# Patient Record
Sex: MALE | Race: BLACK OR AFRICAN AMERICAN | Employment: FULL TIME | URBAN - METROPOLITAN AREA
[De-identification: names, ages, dates, MRNs, and addresses within clinical notes are randomized per-mention and may not be internally consistent; named-entity substitution may affect disease eponyms.]

---

## 2023-08-01 ENCOUNTER — OFFICE VISIT (OUTPATIENT)
Age: 33
End: 2023-08-01

## 2023-08-01 VITALS
SYSTOLIC BLOOD PRESSURE: 130 MMHG | WEIGHT: 205 LBS | HEART RATE: 73 BPM | TEMPERATURE: 97.4 F | OXYGEN SATURATION: 98 % | DIASTOLIC BLOOD PRESSURE: 88 MMHG

## 2023-08-01 DIAGNOSIS — Z20.2 POSSIBLE EXPOSURE TO STD: Primary | ICD-10-CM

## 2023-08-01 ASSESSMENT — ENCOUNTER SYMPTOMS
ALLERGIC/IMMUNOLOGIC NEGATIVE: 1
EYES NEGATIVE: 1
GASTROINTESTINAL NEGATIVE: 1
RESPIRATORY NEGATIVE: 1

## 2023-08-01 NOTE — PROGRESS NOTES
Pink Homans (:  1990) is a 33 y.o. male,New patient, here for evaluation of the following chief complaint(s):  Sexually Transmitted Diseases (Std test, exposure no symptoms. )      ASSESSMENT/PLAN:  Visit Diagnoses and Associated Orders       Possible exposure to STD    -  Primary    Trichomonas by EIA [07044 Custom]   - Future Order    C.trachomatis N.gonorrhoeae DNA, Urine [place] Only) [[id number] Custom]   - Future Order    C.trachomatis N.gonorrhoeae DNA, Urine ([place] Only) [[id number] Custom]      Trichomonas by EIA [35170 Custom]                    Patient presents requesting STD testing. Denies concerns or known exposure. Has previous unprotected sexual intercourse. Now has a new partner and wants to be checked prior to having intercourse with this individual.  States was seen at a health clinic a few weeks ago and had bloodwork for STD's and was told it is all negative. VSS. Exam as documented. Urine trich and GC collected. Results pending. Advised patient we will call over the next few days with results and can advise on treatment if needed. Patient requests work note as he missed work today to be checked. Worn note provided with RTW date of 23. Follow up as needed if symptoms or other concerns arise. Discussed reasons to return or go to the ED. Patient agreeable with plan. SUBJECTIVE/OBJECTIVE:    History provided by:  Patient   used: No       33 y.o. male presents requesting STD testing. Patient is concerned for possible exposure after recent unprotected intercourse. Is seeing a new person and wants to get checked before having intercourse. Denies symptoms at time of exam.  Denies fever, dysuria, abdominal pain, back or flank pain, penile discharge, rash, ulcers, lesions, or other concerns. Denies treatment PTA. Denies significant PMH. Reports history of chlamydia treated with PO antibiotics several years ago.          Vitals:    23

## 2023-08-01 NOTE — PATIENT INSTRUCTIONS
Do not have unprotected intercourse until all results are finalized. We will call you with results of testing over the next few days.

## 2023-08-02 LAB
SPECIMEN TYPE: NORMAL
TRICHOMONAS VAGINALIS SCREEN: NEGATIVE

## 2023-08-03 LAB
C TRACH DNA UR QL NAA+PROBE: NEGATIVE
N GONORRHOEA DNA UR QL NAA+PROBE: NEGATIVE

## 2023-08-04 ENCOUNTER — TELEPHONE (OUTPATIENT)
Dept: URGENT CARE | Age: 33
End: 2023-08-04

## 2023-08-04 NOTE — TELEPHONE ENCOUNTER
Attempted to call patient, number no longer in service. If patient calls office for results please let him know his testing for Gonorrhea and Chlamydia was negative.

## 2023-08-07 ENCOUNTER — NURSE ONLY (OUTPATIENT)
Age: 33
End: 2023-08-07

## 2023-08-07 DIAGNOSIS — Z71.2 ENCOUNTER TO DISCUSS TEST RESULTS: Primary | ICD-10-CM

## 2023-08-07 NOTE — PROGRESS NOTES
Patient returned to clinic for results. Discussed results with patient. All questions answered. He is to return to clinic as needed.     Results for orders placed or performed in visit on 08/01/23 (from the past 2160 hour(s))   C.trachomatis N.gonorrhoeae DNA, Urine ([place] Only)    Specimen: Urine   Result Value Ref Range    C. trachomatis DNA ,Urine Negative Negative    N. gonorrhoeae DNA, Urine Negative Negative   Trichomonas by EIA   Result Value Ref Range    TRICHOMONAS VAGINALIS SCREEN Negative Negative    Specimen Type Urine

## 2023-08-21 ENCOUNTER — HOSPITAL ENCOUNTER (EMERGENCY)
Age: 33
Discharge: HOME | End: 2023-08-21
Attending: EMERGENCY MEDICINE
Payer: COMMERCIAL

## 2023-08-21 VITALS
OXYGEN SATURATION: 97 % | WEIGHT: 201.5 LBS | DIASTOLIC BLOOD PRESSURE: 90 MMHG | HEIGHT: 72 IN | SYSTOLIC BLOOD PRESSURE: 139 MMHG | TEMPERATURE: 98 F | HEART RATE: 86 BPM | RESPIRATION RATE: 14 BRPM | BODY MASS INDEX: 27.29 KG/M2

## 2023-08-21 DIAGNOSIS — K04.7 DENTAL INFECTION: ICD-10-CM

## 2023-08-21 DIAGNOSIS — K02.9 PAIN DUE TO DENTAL CARIES: Primary | ICD-10-CM

## 2023-08-21 PROCEDURE — 99283 EMERGENCY DEPT VISIT LOW MDM: CPT

## 2023-08-21 PROCEDURE — 6370000000 HC RX 637 (ALT 250 FOR IP): Performed by: EMERGENCY MEDICINE

## 2023-08-21 RX ORDER — IBUPROFEN 600 MG/1
600 TABLET ORAL ONCE
Status: COMPLETED | OUTPATIENT
Start: 2023-08-21 | End: 2023-08-21

## 2023-08-21 RX ORDER — PENICILLIN V POTASSIUM 500 MG/1
500 TABLET ORAL ONCE
Status: COMPLETED | OUTPATIENT
Start: 2023-08-21 | End: 2023-08-21

## 2023-08-21 RX ORDER — IBUPROFEN 600 MG/1
600 TABLET ORAL EVERY 6 HOURS PRN
Qty: 40 TABLET | Refills: 0 | Status: SHIPPED | OUTPATIENT
Start: 2023-08-21

## 2023-08-21 RX ORDER — ACETAMINOPHEN 325 MG/1
650 TABLET ORAL ONCE
Status: COMPLETED | OUTPATIENT
Start: 2023-08-21 | End: 2023-08-21

## 2023-08-21 RX ORDER — PENICILLIN V POTASSIUM 500 MG/1
500 TABLET ORAL 4 TIMES DAILY
Qty: 28 TABLET | Refills: 0 | Status: SHIPPED | OUTPATIENT
Start: 2023-08-21 | End: 2023-08-28

## 2023-08-21 RX ORDER — ACETAMINOPHEN 500 MG
1000 TABLET ORAL EVERY 8 HOURS PRN
Qty: 40 TABLET | Refills: 0 | Status: SHIPPED | OUTPATIENT
Start: 2023-08-21

## 2023-08-21 RX ADMIN — ACETAMINOPHEN 650 MG: 325 TABLET ORAL at 15:01

## 2023-08-21 RX ADMIN — PENICILLIN V POTASSIUM 500 MG: 500 TABLET, FILM COATED ORAL at 15:01

## 2023-08-21 RX ADMIN — IBUPROFEN 600 MG: 600 TABLET, FILM COATED ORAL at 15:01

## 2023-08-21 ASSESSMENT — ENCOUNTER SYMPTOMS
SHORTNESS OF BREATH: 0
ABDOMINAL PAIN: 0
NAUSEA: 0
VOMITING: 0
DIARRHEA: 0
TROUBLE SWALLOWING: 0
CONSTIPATION: 0
CHEST TIGHTNESS: 0
SORE THROAT: 0

## 2023-08-21 ASSESSMENT — PAIN SCALES - GENERAL: PAINLEVEL_OUTOF10: 8

## 2023-08-21 NOTE — ED NOTES
Patient DCed from ED at this time. Discussed AVS, follow up, and scripts. They verbalized understanding. Reinforced that should symptoms persist or worsen to return to the ED. They verbalized understanding. Patient ambulated out of ED. RN thanked patient for choosing [facility]).         Nikky Neely RN  08/21/23 [id number]

## 2023-08-21 NOTE — ED PROVIDER NOTES
[address] [facility],[address] ENCOUNTER      Pt Name: Melva Lovett  MRN: [id number]  [address] [facility] 1990  Date of evaluation: 8/21/2023  Provider: Glenn Patel MD    CHIEF COMPLAINT       Chief Complaint   Patient presents with    Dental Pain       HISTORY OF PRESENT ILLNESS    Melva Lovett is a 33 y.o. male who  has no past medical history on file. who presents to the emergency department with complaints of dental pain involving the upper jaw bilaterally. Patient states he is a known cavity on the right with the majority of the tooth missing and a hole in the gum. Patient states he also has some pain in the left upper jaw. Denies any trauma to the face. Denies any swelling in the mouth or difficulty swallowing. No drooling. No dysphonia. Denies any fevers or chills. No nausea or vomiting. States that he tried to get into multiple dentists today but was unable to either schedule an appointment or get anybody to answer the phone. States he took Excedrin earlier today for pain control with minimal relief. Denies any recent antibiotic use. Limitations to history: None, history provided by patient. Nursing Notes were reviewed. Including nursing noted for FM, Surgical History, Past Medical History, Social History, vitals, and allergies; agree with all. REVIEW OF SYSTEMS       Review of Systems   Constitutional:  Negative for chills, diaphoresis and fever. HENT:  Positive for dental problem. Negative for congestion, drooling, sore throat and trouble swallowing. Respiratory:  Negative for chest tightness and shortness of breath. Cardiovascular:  Negative for chest pain and leg swelling. Gastrointestinal:  Negative for abdominal pain, constipation, diarrhea, nausea and vomiting. Genitourinary:  Negative for dysuria, frequency and urgency. Musculoskeletal:  Negative for neck pain. Skin:  Negative for rash.    Neurological:  Negative for weakness and